# Patient Record
Sex: MALE | Race: WHITE | Employment: FULL TIME | URBAN - METROPOLITAN AREA
[De-identification: names, ages, dates, MRNs, and addresses within clinical notes are randomized per-mention and may not be internally consistent; named-entity substitution may affect disease eponyms.]

---

## 2019-03-26 ENCOUNTER — OFFICE VISIT (OUTPATIENT)
Dept: FAMILY MEDICINE CLINIC | Age: 34
End: 2019-03-26

## 2019-03-26 VITALS
TEMPERATURE: 98 F | HEIGHT: 73 IN | WEIGHT: 220.4 LBS | HEART RATE: 66 BPM | BODY MASS INDEX: 29.21 KG/M2 | OXYGEN SATURATION: 98 % | RESPIRATION RATE: 18 BRPM | DIASTOLIC BLOOD PRESSURE: 80 MMHG | SYSTOLIC BLOOD PRESSURE: 130 MMHG

## 2019-03-26 DIAGNOSIS — H65.23 BILATERAL CHRONIC SEROUS OTITIS MEDIA: Primary | ICD-10-CM

## 2019-03-26 DIAGNOSIS — F32.89 OTHER DEPRESSION: ICD-10-CM

## 2019-03-26 RX ORDER — AMOXICILLIN 875 MG/1
875 TABLET, FILM COATED ORAL 2 TIMES DAILY
Qty: 20 TAB | Refills: 0 | Status: SHIPPED | OUTPATIENT
Start: 2019-03-26 | End: 2019-04-05

## 2019-03-26 RX ORDER — BUPROPION HYDROCHLORIDE 150 MG/1
150 TABLET ORAL
Qty: 30 TAB | Refills: 0 | Status: SHIPPED | OUTPATIENT
Start: 2019-03-26 | End: 2019-04-24 | Stop reason: SDUPTHER

## 2019-03-26 NOTE — PROGRESS NOTES
HISTORY OF PRESENT ILLNESS Jey Francisco is a 29 y.o. male. HPI: New patient comes in to get established and complaining bilateral earaches, LT>RT since last October 2018. Pain radiates to his left jaw. It is associated with left ear popping. Denies chills, fever sinus infection cough and chest  congestion. Patient rated high in depression scale and repots the reason for that is caused by family situation and that he is cutting back on on drinking. They moved from [facility] to [place] for his job and currently his wife and two kids and him living with his in laws. They are looking for a house to buy. He reports he was drinking six days a week, he had a DUI and and to take a class for six months. Since then he cut back and is  drinking only I-2 days a week. Beer six pack or more Past Surgical History:  
Procedure Laterality Date  HX HEENT Bilateral 1998 Ear tubes No Known Allergies Current Outpatient Medications:  
  amoxicillin (AMOXIL) 875 mg tablet, Take 1 Tab by mouth two (2) times a day for 10 days. , Disp: 20 Tab, Rfl: 0 
  buPROPion XL (WELLBUTRIN XL) 150 mg tablet, Take 1 Tab by mouth every morning., Disp: 30 Tab, Rfl: 0 Review of Systems Constitutional: Negative. HENT: Positive for ear pain. Negative for congestion, ear discharge, hearing loss, nosebleeds, sinus pain, sore throat and tinnitus. Respiratory: Negative. Negative for stridor. Cardiovascular: Negative. Gastrointestinal: Negative. Psychiatric/Behavioral: Positive for depression. Negative for hallucinations, memory loss, substance abuse and suicidal ideas. The patient is not nervous/anxious and does not have insomnia. Blood pressure 130/80, pulse 66, temperature 98 °F (36.7 °C), temperature source Oral, resp. rate 18, height 6' 1.3\" (1.862 m), weight 220 lb 6.4 oz (100 kg), SpO2 98 %. Physical Exam  
Constitutional: No distress.   
HENT:  
Mouth/Throat: Oropharynx is clear and moist.  
 TMs dull, NO LR bilaterally Neck: Normal range of motion. Neck supple. Cardiovascular: Normal rate and regular rhythm. Exam reveals no gallop. Murmur heard. Pulmonary/Chest: Effort normal and breath sounds normal.  
Abdominal: Soft. Bowel sounds are normal.  
Psychiatric: He has a normal mood and affect. His behavior is normal.  
Nursing note and vitals reviewed. ASSESSMENT and PLAN Diagnoses and all orders for this visit: 
 
1. Bilateral chronic serous otitis media 
-     amoxicillin (AMOXIL) 875 mg tablet; Take 1 Tab by mouth two (2) times a day for 10 days. 2. Other depression 
-     buPROPion XL (WELLBUTRIN XL) 150 mg tablet; Take 1 Tab by mouth every morning. 
      -     Star this in 7 days after taking anabiotics -     Follow up in 4 weeks for physical and evaluation of depression and earache Pt was given an after visit summary which includes diagnosis, current medicines and vital and voiced understanding of treatment plan

## 2019-04-01 ENCOUNTER — OFFICE VISIT (OUTPATIENT)
Dept: FAMILY MEDICINE CLINIC | Age: 34
End: 2019-04-01

## 2019-04-01 VITALS
TEMPERATURE: 98.1 F | OXYGEN SATURATION: 98 % | WEIGHT: 220 LBS | RESPIRATION RATE: 16 BRPM | BODY MASS INDEX: 29.16 KG/M2 | SYSTOLIC BLOOD PRESSURE: 126 MMHG | HEART RATE: 60 BPM | HEIGHT: 73 IN | DIASTOLIC BLOOD PRESSURE: 82 MMHG

## 2019-04-01 DIAGNOSIS — R74.8 ELEVATED LIVER ENZYMES: ICD-10-CM

## 2019-04-01 DIAGNOSIS — L23.7 POISON IVY DERMATITIS: Primary | ICD-10-CM

## 2019-04-01 RX ORDER — PREDNISONE 10 MG/1
TABLET ORAL
Qty: 21 TAB | Refills: 0 | Status: SHIPPED | OUTPATIENT
Start: 2019-04-01 | End: 2019-04-24

## 2019-04-01 RX ORDER — TRIAMCINOLONE ACETONIDE 40 MG/ML
40 INJECTION, SUSPENSION INTRA-ARTICULAR; INTRAMUSCULAR ONCE
Qty: 1 ML | Refills: 0
Start: 2019-04-01 | End: 2019-04-01

## 2019-04-01 NOTE — PROGRESS NOTES
Chief Complaint   Patient presents with   [facility] Ivy/Poison [place]/Poison [place] Exposure     Started in eyes, hands, arms and knees and legs 4 days. 1. Have you been to the ER, urgent care clinic since your last visit? Hospitalized since your last visit? No    2. Have you seen or consulted any other health care providers outside of the [address] [place] [address] since your last visit? Include any pap smears or colon screening.  No

## 2019-04-01 NOTE — PROGRESS NOTES
HISTORY OF PRESENT ILLNESS  Mary Hays is a 29 y.o. male. HPI: Patient is complaining of rash with itching over his both arms and lower legs. He works for [employer]. He stopped drinking will check his CMP today. Past Medical History:   Diagnosis Date    Over weight      Past Surgical History:   Procedure Laterality Date    HX HEENT Bilateral 1998    Ear tubes    No Known Allergies    Current Outpatient Medications:     predniSONE (STERAPRED DS) 10 mg dose pack, See administration instruction per 10mg dose pack, Disp: 21 Tab, Rfl: 0    triamcinolone acetonide (KENALOG) 40 mg/mL injection, 1 mL by IntraMUSCular route once for 1 dose., Disp: 1 mL, Rfl: 0    amoxicillin (AMOXIL) 875 mg tablet, Take 1 Tab by mouth two (2) times a day for 10 days. , Disp: 20 Tab, Rfl: 0    buPROPion XL (WELLBUTRIN XL) 150 mg tablet, Take 1 Tab by mouth every morning., Disp: 30 Tab, Rfl: 0  Review of Systems   Constitutional: Negative. Respiratory: Negative. Cardiovascular: Negative. Gastrointestinal: Negative. Skin: Positive for itching and rash. Blood pressure 126/82, pulse 60, temperature 98.1 °F (36.7 °C), temperature source Oral, resp. rate 16, height 6' 1.3\" (1.862 m), weight 220 lb (99.8 kg), SpO2 98 %. Physical Exam   Constitutional: No distress. HENT:   Mouth/Throat: Oropharynx is clear and moist.   Neck: Normal range of motion. Neck supple. Cardiovascular: Normal rate and regular rhythm. No murmur heard. Pulmonary/Chest: Effort normal and breath sounds normal.   Abdominal: Soft. Bowel sounds are normal.   Skin: There is erythema. Vesicular erythematous rash over his lower arms and legs   Nursing note and vitals reviewed. ASSESSMENT and PLAN  Diagnoses and all orders for this visit:    1.  Poison ivy dermatitis  -     predniSONE (STERAPRED DS) 10 mg dose pack; See administration instruction per 10mg dose pack  -     TRIAMCINOLONE ACETONIDE INJ  - triamcinolone acetonide (KENALOG) 40 mg/mL injection; 1 mL by IntraMUSCular route once for 1 dose.     2. Elevated liver enzymes  -     METABOLIC PANEL, COMPREHENSIVE  Pt was given an after visit summary which includes diagnosis, current medicines and vital and voiced understanding of treatment plan

## 2019-04-01 NOTE — PATIENT INSTRUCTIONS
Poison [place], [place], and [place]: Care Instructions  Your Care Instructions    Poison ivy, poison oak, and poison sumac are plants that can cause a skin rash upon contact. The red, itchy rash often shows up in lines or streaks and may cause fluid-filled blisters or large, raised hives. The rash is caused by an allergic reaction to an oil in poison ivy, oak, and sumac. The rash may occur when you touch the plant or when you touch clothing, pet fur, sporting gear, gardening tools, or other objects that have come in contact with one of these plants. You cannot catch or spread the rash, even if you touch it or the blister fluid, because the plant oil will already have been absorbed or washed off the skin. The rash may seem to be spreading, but either it is still developing from earlier contact or you have touched something that still has the plant oil on it. Follow-up care is a key part of your treatment and safety. Be sure to make and go to all appointments, and call your doctor if you are having problems. It's also a good idea to know your test results and keep a list of the medicines you take. How can you care for yourself at home? · If your doctor prescribed a cream, use it as directed. If your doctor prescribed medicine, take it exactly as prescribed. Call your doctor if you think you are having a problem with your medicine. · Use cold, wet cloths to reduce itching. · Keep cool, and stay out of the sun. · Leave the rash open to the air. · Wash all clothing or other things that may have come in contact with the plant oil. · Avoid most lotions and ointments until the rash heals. Calamine lotion may help relieve symptoms of a plant rash. Use it 3 or 4 times a day. To prevent poison ivy exposure  If you know that you will be near poison ivy, oak, or sumac, you can try these options:  · Use a product designed to help prevent plant oil from getting on the skin.  These products, such as Ivy X Pre-Contact Skin Solution, come in lotions, sprays, or towelettes. You put the product on your skin right before you go outdoors. · If you did not use a preventive product and you have had contact with plant oil, clean it off your skin as soon as possible. Use a product such as [employer] Original Outdoor Skin Cleanser. These products can also be used to clean plant oil from clothing or tools. When should you call for help? Call your doctor now or seek immediate medical care if:    · Your rash gets worse, and you start to feel bad and have a fever, a stiff neck, nausea, and vomiting.     · You have signs of infection, such as:  ? Increased pain, swelling, warmth, or redness. ? Red streaks leading from the rash. ? Pus draining from the rash. ? A fever.    Watch closely for changes in your health, and be sure to contact your doctor if:    · You have new blisters or bruises, or the rash spreads and looks like a sunburn.     · The rash gets worse, or it comes back after nearly disappearing.     · You think a medicine you are using is making your rash worse.     · Your rash does not clear up after 1 to 2 weeks of home treatment.     · You have joint aches or body aches with your rash. Where can you learn more? Go to http://amanda-marc.info/. Enter X844 in the search box to learn more about \"Poison [place], [place], and [place]: Care Instructions. \"  Current as of: April 17, 2018  Content Version: 11.9  © 7971-4992 [employer]. Care instructions adapted under license by [employer] (which disclaims liability or warranty for this information). If you have questions about a medical condition or this instruction, always ask your healthcare professional. [place] [address] any warranty or liability for your use of this information.          Poison [place], [place], and [place]: Care Instructions  Your Care Instructions    Poison ivy, poison oak, and poison sumac are plants that can cause a skin rash upon contact. The red, itchy rash often shows up in lines or streaks and may cause fluid-filled blisters or large, raised hives. The rash is caused by an allergic reaction to an oil in poison ivy, oak, and sumac. The rash may occur when you touch the plant or when you touch clothing, pet fur, sporting gear, gardening tools, or other objects that have come in contact with one of these plants. You cannot catch or spread the rash, even if you touch it or the blister fluid, because the plant oil will already have been absorbed or washed off the skin. The rash may seem to be spreading, but either it is still developing from earlier contact or you have touched something that still has the plant oil on it. Follow-up care is a key part of your treatment and safety. Be sure to make and go to all appointments, and call your doctor if you are having problems. It's also a good idea to know your test results and keep a list of the medicines you take. How can you care for yourself at home? · If your doctor prescribed a cream, use it as directed. If your doctor prescribed medicine, take it exactly as prescribed. Call your doctor if you think you are having a problem with your medicine. · Use cold, wet cloths to reduce itching. · Keep cool, and stay out of the sun. · Leave the rash open to the air. · Wash all clothing or other things that may have come in contact with the plant oil. · Avoid most lotions and ointments until the rash heals. Calamine lotion may help relieve symptoms of a plant rash. Use it 3 or 4 times a day. To prevent poison ivy exposure  If you know that you will be near poison ivy, oak, or sumac, you can try these options:  · Use a product designed to help prevent plant oil from getting on the skin. These products, such as Ivy X Pre-Contact Skin Solution, come in lotions, sprays, or towelettes. You put the product on your skin right before you go outdoors.   · If you did not use a preventive product and you have had contact with plant oil, clean it off your skin as soon as possible. Use a product such as [employer] Original Outdoor Skin Cleanser. These products can also be used to clean plant oil from clothing or tools. When should you call for help? Call your doctor now or seek immediate medical care if:    · Your rash gets worse, and you start to feel bad and have a fever, a stiff neck, nausea, and vomiting.     · You have signs of infection, such as:  ? Increased pain, swelling, warmth, or redness. ? Red streaks leading from the rash. ? Pus draining from the rash. ? A fever.    Watch closely for changes in your health, and be sure to contact your doctor if:    · You have new blisters or bruises, or the rash spreads and looks like a sunburn.     · The rash gets worse, or it comes back after nearly disappearing.     · You think a medicine you are using is making your rash worse.     · Your rash does not clear up after 1 to 2 weeks of home treatment.     · You have joint aches or body aches with your rash. Where can you learn more? Go to http://amanda-marc.info/. Enter G861 in the search box to learn more about \"Poison [place], [place], and [place]: Care Instructions. \"  Current as of: April 17, 2018  Content Version: 11.9  © 0837-7442 [employer]. Care instructions adapted under license by [employer] (which disclaims liability or warranty for this information). If you have questions about a medical condition or this instruction, always ask your healthcare professional. [place] [address] any warranty or liability for your use of this information.

## 2019-04-02 LAB
ALBUMIN SERPL-MCNC: 5 G/DL (ref 3.5–5.5)
ALBUMIN/GLOB SERPL: 2 {RATIO} (ref 1.2–2.2)
ALP SERPL-CCNC: 80 IU/L (ref 39–117)
ALT SERPL-CCNC: 17 IU/L (ref 0–44)
AST SERPL-CCNC: 15 IU/L (ref 0–40)
BILIRUB SERPL-MCNC: 0.3 MG/DL (ref 0–1.2)
BUN SERPL-MCNC: 14 MG/DL (ref 6–20)
BUN/CREAT SERPL: 17 (ref 9–20)
CALCIUM SERPL-MCNC: 10 MG/DL (ref 8.7–10.2)
CHLORIDE SERPL-SCNC: 101 MMOL/L (ref 96–106)
CO2 SERPL-SCNC: 21 MMOL/L (ref 20–29)
CREAT SERPL-MCNC: 0.83 MG/DL (ref 0.76–1.27)
GLOBULIN SER CALC-MCNC: 2.5 G/DL (ref 1.5–4.5)
GLUCOSE SERPL-MCNC: 93 MG/DL (ref 65–99)
POTASSIUM SERPL-SCNC: 4.1 MMOL/L (ref 3.5–5.2)
PROT SERPL-MCNC: 7.5 G/DL (ref 6–8.5)
SODIUM SERPL-SCNC: 141 MMOL/L (ref 134–144)

## 2019-04-24 ENCOUNTER — OFFICE VISIT (OUTPATIENT)
Dept: FAMILY MEDICINE CLINIC | Age: 34
End: 2019-04-24

## 2019-04-24 VITALS
RESPIRATION RATE: 16 BRPM | HEART RATE: 68 BPM | BODY MASS INDEX: 28.52 KG/M2 | DIASTOLIC BLOOD PRESSURE: 84 MMHG | WEIGHT: 215.2 LBS | SYSTOLIC BLOOD PRESSURE: 118 MMHG | TEMPERATURE: 97.9 F | OXYGEN SATURATION: 99 % | HEIGHT: 73 IN

## 2019-04-24 DIAGNOSIS — F32.89 OTHER DEPRESSION: ICD-10-CM

## 2019-04-24 DIAGNOSIS — Z23 ENCOUNTER FOR IMMUNIZATION: ICD-10-CM

## 2019-04-24 DIAGNOSIS — Z00.00 GENERAL MEDICAL EXAM: Primary | ICD-10-CM

## 2019-04-24 RX ORDER — BUPROPION HYDROCHLORIDE 150 MG/1
150 TABLET ORAL
Qty: 90 TAB | Refills: 3 | Status: SHIPPED | OUTPATIENT
Start: 2019-04-24 | End: 2020-04-21 | Stop reason: SDUPTHER

## 2019-04-24 NOTE — PATIENT INSTRUCTIONS
Testicular Self-Exam: Care Instructions  Your Care Instructions    A self-exam is a way for you to check for cancer of the testicles. Although testicular cancer is rare, it is one of the most common tumors in men younger than 28. Many testicular cancers are found during self-exam. In the early stages of testicular cancer, the lump, which may be about the size of a pea, usually is not painful. Testicular cancer, especially if treated early, is very often cured. By doing this self-exam regularly, you can learn the normal size, shape, and weight of your testicles. This allows you to note any changes. Follow-up care is a key part of your treatment and safety. Be sure to make and go to all appointments, and call your doctor if you are having problems. It's also a good idea to know your test results and keep a list of the medicines you take. How can you care for yourself at home? · The exam is best done during or after a bath or shower--when the scrotum, the skin sac that holds the testicles, is relaxed. · Stand and place your right leg on a raised surface about chair height. Then gently feel your scrotum until you find the right testicle. · Roll the testicle gently but firmly between your thumb and fingers of both hands. Carefully feel the surface for lumps. Feel for any change in the size, shape, or texture of the testicle. The testicle should feel round and smooth. It is normal for one testicle to be slightly larger than the other one. · Repeat this for the left side. Feel the entire surface of both testicles. · You may feel the epididymis, the soft tube behind each testicle. Become familiar with this structure so that you won't mistake it for a lump. When should you call for help?   Call your doctor now or seek immediate medical care if:    · You have pain in a testicle.    Watch closely for changes in your health, and be sure to contact your doctor if:    · You notice a change in a testicle.     · You notice a lump in a testicle. Where can you learn more? Go to http://amanda-marc.info/. Enter U283 in the search box to learn more about \"Testicular Self-Exam: Care Instructions. \"  Current as of: September 26, 2018  Content Version: 11.9  © 1967-1083 [employer]. Care instructions adapted under license by [employer] (which disclaims liability or warranty for this information). If you have questions about a medical condition or this instruction, always ask your healthcare professional. [address] any warranty or liability for your use of this information.

## 2019-04-24 NOTE — PROGRESS NOTES
Subjective:   Kenzei Hopkins is a 29 y.o. male presenting for his annual checkup. ROS:  Feeling well. No dyspnea or chest pain on exertion. No abdominal pain, change in bowel habits, black or bloody stools. No urinary tract or prostatic symptoms. No neurological complaints. Specific concerns today:  Reports that Wellbutrin is helping with depression and that he feels more energetic. BMI is 28.16, watching diet and exercising. Due for TDaP    There are no active problems to display for this patient. Current Outpatient Medications   Medication Sig Dispense Refill    buPROPion XL (WELLBUTRIN XL) 150 mg tablet Take 1 Tab by mouth every morning. 30 Tab 0     No Known Allergies  Past Medical History:   Diagnosis Date    Depression     Over weight     Over weight      Past Surgical History:   Procedure Laterality Date    HX HEENT Bilateral 1998    Ear tubes      Family History   Problem Relation Age of Onset    Cancer Maternal Aunt         Breast cancer,  from it.  Heart Disease Maternal Grandmother     Stroke Maternal Grandmother     Diabetes Maternal Grandmother      Social History     Tobacco Use    Smoking status: Former Smoker     Last attempt to quit: 2017     Years since quittin.8    Smokeless tobacco: Current User    Tobacco comment: Vapes occassionally. Substance Use Topics    Alcohol use: Yes     Comment: Occassional        Objective:     Visit Vitals  /84   Pulse 68   Temp 97.9 °F (36.6 °C) (Oral)   Resp 16   Ht 6' 1.3\" (1.862 m)   Wt 215 lb 3.2 oz (97.6 kg)   SpO2 99%   BMI 28.16 kg/m²     The patient appears well, alert, oriented x 3, in no distress. ENT normal.  Neck supple. No adenopathy or thyromegaly. REMIGIO. Lungs are clear, good air entry, no wheezes, rhonchi or rales. S1 and S2 normal, no murmurs, regular rate and rhythm. Abdomen is soft without tenderness, guarding, mass or organomegaly.   exam: no penile lesions or discharge, no testicular masses or tenderness, no hernias. Extremities show no edema, normal peripheral pulses. Neurological is normal without focal findings. Assessment/Plan:   healthy adult male  lose weight, increase physical activity, follow low fat diet, routine labs ordered. ICD-10-CM ICD-9-CM    1. General medical exam Z00.00 V70.9 LIPID PANEL      CBC W/O DIFF      T4, FREE      TSH 3RD GENERATION      CANCELED: METABOLIC PANEL, COMPREHENSIVE   2. BMI 28.0-28.9,adult Z68.28 V85.24 Diet and exericse   3.  Encounter for immunization Z23 V03.89 TETANUS, DIPHTHERIA TOXOIDS AND ACELLULAR PERTUSSIS VACCINE (TDAP), IN INDIVIDS. >=7, IM   Pt was given an after visit summary which includes diagnosis, current medicines and vital and voiced understanding of treatment plan

## 2019-04-25 LAB
CHOLEST SERPL-MCNC: 219 MG/DL (ref 100–199)
ERYTHROCYTE [DISTWIDTH] IN BLOOD BY AUTOMATED COUNT: 13.7 % (ref 12.3–15.4)
HCT VFR BLD AUTO: 45 % (ref 37.5–51)
HDLC SERPL-MCNC: 85 MG/DL
HGB BLD-MCNC: 14.7 G/DL (ref 13–17.7)
INTERPRETATION, 910389: NORMAL
LDLC SERPL CALC-MCNC: 118 MG/DL (ref 0–99)
MCH RBC QN AUTO: 28.7 PG (ref 26.6–33)
MCHC RBC AUTO-ENTMCNC: 32.7 G/DL (ref 31.5–35.7)
MCV RBC AUTO: 88 FL (ref 79–97)
PLATELET # BLD AUTO: 230 X10E3/UL (ref 150–379)
RBC # BLD AUTO: 5.13 X10E6/UL (ref 4.14–5.8)
T4 FREE SERPL-MCNC: 1.25 NG/DL (ref 0.82–1.77)
TRIGL SERPL-MCNC: 80 MG/DL (ref 0–149)
TSH SERPL DL<=0.005 MIU/L-ACNC: 1.28 UIU/ML (ref 0.45–4.5)
VLDLC SERPL CALC-MCNC: 16 MG/DL (ref 5–40)
WBC # BLD AUTO: 6.6 X10E3/UL (ref 3.4–10.8)

## 2019-11-15 ENCOUNTER — OFFICE VISIT (OUTPATIENT)
Dept: FAMILY MEDICINE CLINIC | Age: 34
End: 2019-11-15

## 2019-11-15 VITALS
TEMPERATURE: 98.5 F | SYSTOLIC BLOOD PRESSURE: 133 MMHG | DIASTOLIC BLOOD PRESSURE: 81 MMHG | HEIGHT: 73 IN | HEART RATE: 72 BPM | OXYGEN SATURATION: 96 % | BODY MASS INDEX: 28.36 KG/M2 | RESPIRATION RATE: 18 BRPM | WEIGHT: 214 LBS

## 2019-11-15 DIAGNOSIS — R59.0 LYMPHADENOPATHY, AXILLARY: Primary | ICD-10-CM

## 2019-11-15 RX ORDER — DICLOFENAC SODIUM 75 MG/1
75 TABLET, DELAYED RELEASE ORAL 2 TIMES DAILY
Qty: 14 TAB | Refills: 0 | Status: SHIPPED | OUTPATIENT
Start: 2019-11-15

## 2019-11-15 NOTE — PROGRESS NOTES
Chief Complaint   Patient presents with    Mass     Patient is in the office todaty with complaints of bump under right armpit. 1. Have you been to the ER, urgent care clinic since your last visit? Hospitalized since your last visit? No    2. Have you seen or consulted any other health care providers outside of the [address] [place] [address] since your last visit? Include any pap smears or colon screening.  No

## 2019-11-15 NOTE — PROGRESS NOTES
HISTORY OF PRESENT ILLNESS  Macey Varma is a 36 y.o. male. HPI: Patient noticed small nut under right axilla three weeks ago. Denies pain  Past Medical History:   Diagnosis Date    Depression     Over weight     Over weight      Past Surgical History:   Procedure Laterality Date    HX HEENT Bilateral 1998    Ear tubes    No Known Allergies    Current Outpatient Medications:     diclofenac EC (VOLTAREN) 75 mg EC tablet, Take 1 Tab by mouth two (2) times a day., Disp: 14 Tab, Rfl: 0    buPROPion XL (WELLBUTRIN XL) 150 mg tablet, Take 1 Tab by mouth every morning., Disp: 90 Tab, Rfl: 3  Review of Systems   Constitutional: Negative. Respiratory: Negative. Cardiovascular: Negative. Gastrointestinal: Negative. Blood pressure 133/81, pulse 72, temperature 98.5 °F (36.9 °C), temperature source Oral, resp. rate 18, height 6' 1\" (1.854 m), weight 214 lb (97.1 kg), SpO2 96 %. Physical Exam   Constitutional: No distress. HENT:   Mouth/Throat: Oropharynx is clear and moist.   Neck: Normal range of motion. Neck supple. Cardiovascular: Normal rate and regular rhythm. No murmur heard. Pulmonary/Chest: Effort normal and breath sounds normal.   Right axillary lymph node enlargement    Abdominal: Soft. Bowel sounds are normal.   Nursing note and vitals reviewed. ASSESSMENT and PLAN  Diagnoses and all orders for this visit:    1. Lymphadenopathy, axillary  -     diclofenac EC (VOLTAREN) 75 mg EC tablet; Take 1 Tab by mouth two (2) times a day.         -     Call in one week if not resolved, will order US  Pt was given an after visit summary which includes diagnosis, current medicines and vital and voiced understanding of treatment plan

## 2019-12-03 ENCOUNTER — TELEPHONE (OUTPATIENT)
Dept: FAMILY MEDICINE CLINIC | Age: 34
End: 2019-12-03

## 2019-12-03 DIAGNOSIS — R59.0 LYMPHADENOPATHY, AXILLARY: Primary | ICD-10-CM

## 2019-12-03 NOTE — TELEPHONE ENCOUNTER
Patient is calling stating that he is finished taking diclofenac EC (VOLTAREN) 75 mg EC tablet, pt states that he still has a bump under his armpit. Pt was told to call if it was still their after finishing medication. Pt would like to know how to proceed further.         .Pharmacy on file verified        Best callback:[phone]  LOV: Friday, November 15, 2019

## 2019-12-11 ENCOUNTER — TELEPHONE (OUTPATIENT)
Dept: FAMILY MEDICINE CLINIC | Age: 34
End: 2019-12-11

## 2019-12-11 ENCOUNTER — HOSPITAL ENCOUNTER (OUTPATIENT)
Dept: ULTRASOUND IMAGING | Age: 34
Discharge: HOME | End: 2019-12-11
Payer: COMMERCIAL

## 2019-12-11 DIAGNOSIS — L72.0 EPIDERMAL CYST: Primary | ICD-10-CM

## 2019-12-11 DIAGNOSIS — R59.0 LYMPHADENOPATHY, AXILLARY: ICD-10-CM

## 2019-12-11 PROCEDURE — 76882 US LMTD JT/FCL EVL NVASC XTR: CPT

## 2019-12-11 NOTE — TELEPHONE ENCOUNTER
Called, spoke to pt. Two pt identifiers confirmed. Writer informed patient of US results and recommendations and that result will be mail too him. Pt verbalized understanding of information discussed w/ no further questions at this time.

## 2020-04-21 DIAGNOSIS — F32.89 OTHER DEPRESSION: ICD-10-CM

## 2020-04-21 NOTE — TELEPHONE ENCOUNTER
Last Visit: 11/15/19  NP [address]  Next Appointment: Not scheduled  Previous Refill Encounter(s): 4/24/19  90 + 3 refills    Requested Prescriptions     Pending Prescriptions Disp Refills    buPROPion XL (WELLBUTRIN XL) 150 mg tablet 90 Tab 3     Sig: Take 1 Tab by mouth every morning.

## 2020-04-23 RX ORDER — BUPROPION HYDROCHLORIDE 150 MG/1
150 TABLET ORAL
Qty: 90 TAB | Refills: 1 | Status: SHIPPED | OUTPATIENT
Start: 2020-04-23 | End: 2020-04-28

## 2020-04-24 ENCOUNTER — TELEPHONE (OUTPATIENT)
Dept: FAMILY MEDICINE CLINIC | Age: 35
End: 2020-04-24

## 2020-04-28 ENCOUNTER — VIRTUAL VISIT (OUTPATIENT)
Dept: FAMILY MEDICINE CLINIC | Age: 35
End: 2020-04-28

## 2020-04-28 DIAGNOSIS — F32.9 REACTIVE DEPRESSION: Primary | ICD-10-CM

## 2020-04-28 RX ORDER — BUPROPION HYDROCHLORIDE 300 MG/1
300 TABLET ORAL
Qty: 30 TAB | Refills: 2 | Status: SHIPPED | OUTPATIENT
Start: 2020-04-28 | End: 2020-06-12

## 2020-04-28 NOTE — PROGRESS NOTES
Jamee Soriano  36 y.o. male  1985  [address] [address]  [id number]     ROSALBA Santoro       Encounter Date: 4/28/2020           Established Patient Visit Note: Yohan Marquez NP    Reason for Appointment:  Chief Complaint   Patient presents with    Depression       History of Present Illness:  History provided by patient    Jamee Soriano is a 36 y.o. male who presents today for:  depression, he has history of depression and take Wellbutrin 150 mg daily. He state states that medication worked in the past, but due to recent developments it is not working anymore. Currently he is working from home, he is  and lives  wife and his kids  He is not able to go out much or visit family and friends and feels isolated      Review of Systems  Review of Systems   Constitutional: Negative. Respiratory: Negative. Psychiatric/Behavioral: Positive for depression. Negative for hallucinations, memory loss, substance abuse and suicidal ideas. The patient is not nervous/anxious and does not have insomnia. Allergies: Patient has no known allergies. Medications: (Updated to reflect final medication list after visit)  Stop Wellbutrin 150  Current Outpatient Medications:    start buPROPion XL (WELLBUTRIN XL) 300 mg XL tablet, Take 1 Tab by mouth every morning., Disp: 30 Tab, Rfl: 2    diclofenac EC (VOLTAREN) 75 mg EC tablet, Take 1 Tab by mouth two (2) times a day., Disp: 14 Tab, Rfl: 0    History  Patient Care Team:  Mehreen Fofana NP as PCP - General (Family Practice)  Mehreen Fofana NP as P[facility]    Past Medical History: he has a past medical history of Depression, Over weight, and Over weight. Past Surgical History: he has a past surgical history that includes hx heent (Bilateral, 1998).     Family Medical History: family history includes Cancer in his maternal aunt; Diabetes in his maternal grandmother; Heart Disease in his maternal grandmother; Stroke in his maternal grandmother. Social History: he reports that he quit smoking about 2 years ago. He uses smokeless tobacco. He reports current alcohol use. He reports that he does not use drugs. No specialty comments available. Objective:   Vital Signs  Unable to obtain vital signs today as patient does not have equipment for this at home    Physical Exam  Neurological:      Mental Status: He is alert and oriented to person, place, and time. Psychiatric:         Mood and Affect: Mood normal.         Thought Content: Thought content normal.       Assessment & Plan:    1. Reactive depression      I was in the office while conducting this encounter. Consent:  He and/or his healthcare decision maker is aware that this patient-initiated Telehealth encounter is a billable service, with coverage as determined by his insurance carrier. He is aware that he may receive a bill and has provided verbal consent to proceed: Yes    This virtual visit was conducted telephone encounter only. -  I affirm this is a Patient Initiated Episode with an Established Patient who has not had a related appointment within my department in the past 7 days or scheduled within the next 24 hours. Note: this encounter is not billable if this call serves to triage the patient into an appointment for the relevant concern. Total Time: minutes: 11-20 minutes. I have discussed the diagnosis with the patient and the intended plan as seen in the above orders. The patient has received an after-visit summary along with patient information handout. I have discussed medication side effects and warnings with the patient as well.     Disposition        Mary Lopez NP

## 2020-06-11 NOTE — TELEPHONE ENCOUNTER
PCP: Neyda Alexandre NP    Last appt: 4/28/2020  No future appointments.     Requested Prescriptions     Pending Prescriptions Disp Refills    buPROPion XL (WELLBUTRIN XL) 300 mg XL tablet [Pharmacy Med Name: BUPROPION HCL  MG TABLET] 30 Tab 2     Sig: TAKE 1 TABLET BY MOUTH EVERY DAY IN THE MORNING

## 2020-06-12 RX ORDER — BUPROPION HYDROCHLORIDE 300 MG/1
TABLET ORAL
Qty: 30 TAB | Refills: 5 | Status: SHIPPED | OUTPATIENT
Start: 2020-06-12 | End: 2020-08-06 | Stop reason: SDUPTHER

## 2020-08-06 NOTE — TELEPHONE ENCOUNTER
MARGARITA Kasper,    Request for 90 d/s. Updated if appropriate. Thanks, Rhoda Parekh    Last Visit: VV 4/28/20  MARGARITA Kasper  Next Appointment: Not scheduled  Previous Refill Encounter(s): 6/12/20  30 + 5    Requested Prescriptions     Pending Prescriptions Disp Refills    buPROPion XL (WELLBUTRIN XL) 300 mg XL tablet 90 Tab 1     Sig: Take 1 Tab by mouth every morning.

## 2020-08-07 RX ORDER — BUPROPION HYDROCHLORIDE 300 MG/1
300 TABLET ORAL
Qty: 90 TAB | Refills: 1 | Status: SHIPPED | OUTPATIENT
Start: 2020-08-07

## 2022-06-06 NOTE — PROGRESS NOTES
Chief Complaint   Patient presents with    Complete Physical     Yearly Physical.    Labs     Fasting     1. Have you been to the ER, urgent care clinic since your last visit? Hospitalized since your last visit? No    2. Have you seen or consulted any other health care providers outside of the [address] [place] [address] since your last visit? Include any pap smears or colon screening.  No 4 = No assist / stand by assistance

## 2023-04-04 NOTE — TELEPHONE ENCOUNTER
Called, spoke to pt. Two pt identifiers confirmed. Writer made patient aware that US was order and that they will call with an appointment. Pt verbalized understanding of information discussed w/ no further questions at this time. Statement Selected

## 2023-05-29 RX ORDER — DICLOFENAC SODIUM 75 MG/1
75 TABLET, DELAYED RELEASE ORAL 2 TIMES DAILY
COMMUNITY
Start: 2019-11-15

## 2023-05-29 RX ORDER — BUPROPION HYDROCHLORIDE 300 MG/1
300 TABLET ORAL
COMMUNITY
Start: 2020-08-07